# Patient Record
Sex: MALE | Race: WHITE | URBAN - NONMETROPOLITAN AREA
[De-identification: names, ages, dates, MRNs, and addresses within clinical notes are randomized per-mention and may not be internally consistent; named-entity substitution may affect disease eponyms.]

---

## 2018-01-23 ENCOUNTER — DOCUMENTATION ONLY (OUTPATIENT)
Dept: FAMILY MEDICINE | Facility: OTHER | Age: 18
End: 2018-01-23

## 2018-01-23 RX ORDER — ARIPIPRAZOLE 5 MG/1
5 TABLET ORAL EVERY MORNING
COMMUNITY
Start: 2015-07-06 | End: 2018-04-10

## 2018-04-10 ENCOUNTER — APPOINTMENT (OUTPATIENT)
Dept: GENERAL RADIOLOGY | Facility: CLINIC | Age: 18
End: 2018-04-10
Attending: EMERGENCY MEDICINE
Payer: COMMERCIAL

## 2018-04-10 ENCOUNTER — HOSPITAL ENCOUNTER (EMERGENCY)
Facility: CLINIC | Age: 18
Discharge: HOME | End: 2018-04-10
Attending: EMERGENCY MEDICINE | Admitting: EMERGENCY MEDICINE
Payer: COMMERCIAL

## 2018-04-10 ENCOUNTER — TELEPHONE (OUTPATIENT)
Dept: FAMILY MEDICINE | Facility: OTHER | Age: 18
End: 2018-04-10

## 2018-04-10 VITALS
TEMPERATURE: 98.6 F | RESPIRATION RATE: 16 BRPM | SYSTOLIC BLOOD PRESSURE: 130 MMHG | WEIGHT: 156 LBS | DIASTOLIC BLOOD PRESSURE: 88 MMHG | HEART RATE: 74 BPM | OXYGEN SATURATION: 100 % | BODY MASS INDEX: 27.42 KG/M2

## 2018-04-10 DIAGNOSIS — R07.89 ATYPICAL CHEST PAIN: ICD-10-CM

## 2018-04-10 PROCEDURE — 71046 X-RAY EXAM CHEST 2 VIEWS: CPT | Mod: TC

## 2018-04-10 PROCEDURE — 93010 ELECTROCARDIOGRAM REPORT: CPT | Mod: Z6 | Performed by: EMERGENCY MEDICINE

## 2018-04-10 PROCEDURE — 99284 EMERGENCY DEPT VISIT MOD MDM: CPT | Mod: 25 | Performed by: EMERGENCY MEDICINE

## 2018-04-10 PROCEDURE — 93005 ELECTROCARDIOGRAM TRACING: CPT | Performed by: EMERGENCY MEDICINE

## 2018-04-10 NOTE — ED PROVIDER NOTES
"  History     Chief Complaint   Patient presents with     Chest Pain     The history is provided by the patient and medical records.     This is an otherwise basically healthy 17-year-old male who identifies as female presenting with chest pain.  Patient has had some vague chest pains that come and go for \"10 years\".  The pain is described as a \"electric pain\".  Usually it will come on with no inciting event and last for up to 3 minutes.  Today it seemed to last longer than usual.  At the time, patient had just woke up and was playing a video game and drinking some coffee.  Apparently, the pains have been in various areas of the chest but today it was underneath the left breast area.  Patient denies any cough or cold symptoms, fevers, chills, shortness of breath or increased pain with deep breath.  There is currently no pain.  No calf pain or swelling.  Patient does not smoke.  No illicit drug use.  No increased exercise or strenuous activity.  No trauma.  No other symptoms including nausea, vomiting, bowel or bladder symptoms, back pain.    Patient states that his mom and younger brother have been diagnosed with \"pleuritic spasms\" with similar chest pain symptoms in the past.  Patient has not taken any medications for this pain.  No hormone medications.    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% 11/08/2012     Priority: Medium        Past Medical History:  "   Past Medical History:   Diagnosis Date     Asthma      Attention-deficit hyperactivity disorder, predominantly inattentive type      Cardiac murmur      Other ill-defined and unknown causes of morbidity and mortality        Past Surgical History:    Past Surgical History:   Procedure Laterality Date     OTHER SURGICAL HISTORY      [id number],NO PREVIOUS SURGERY       Family History:    Family History   Problem Relation Age of Onset     Family History Negative Father      Good Health     Family History Negative Mother      Good Health     Other - See Comments Maternal Uncle      Francesca's Symdrome     Thyroid Disease Paternal Grandmother      Thyroid Disease     Thyroid Disease Paternal Uncle      Thyroid Disease       Social History:  Marital Status:  Single [1]  Social History   Substance Use Topics     Smoking status: Never Smoker     Smokeless tobacco: Never Used      Comment: Quit smoking: Dad smokes outside & sometimes in the car     Alcohol use No        Medications:      No current outpatient prescriptions on file.      Review of Systems   All other ROS reviewed and are negative or non-contributory except as stated in HPI.     Physical Exam   BP: 130/88  Pulse: 74  Temp: 98.6  F (37  C)  Resp: 16  Weight: 70.8 kg (156 lb)  SpO2: 100 %      Physical Exam   Constitutional: He is oriented to person, place, and time. He appears well-developed and well-nourished.   Patient is sitting in the bed, appears healthy, interactive.  Side ponytails.   HENT:   Head: Normocephalic.   Nose: Nose normal.   Mouth/Throat: Oropharynx is clear and moist.   Bilateral canals occluded by cerumen   Eyes: Conjunctivae and EOM are normal. Pupils are equal, round, and reactive to light. No scleral icterus.   Neck: Normal range of motion. Neck supple.   Cardiovascular: Normal rate, regular rhythm, normal heart sounds and intact distal pulses.    No murmur heard.  Pulmonary/Chest: Effort normal and breath sounds normal. He exhibits no  tenderness.   Gynecomastia   Abdominal: Soft. Bowel sounds are normal. There is no tenderness.   Musculoskeletal: Normal range of motion. He exhibits no edema or tenderness.   Neurological: He is alert and oriented to person, place, and time. He exhibits normal muscle tone.   Skin: Skin is warm and dry. He is not diaphoretic.   Psychiatric: He has a normal mood and affect. His behavior is normal.   Vitals reviewed.      ED Course (with Medical Decision Making)  Pt seen and examined by me.  RN and EPIC notes reviewed.      Patient with atypical chest pain as described.  This is been going on for many years.  Currently chest pain-free.  No significant risk factors for PE.    EKG was done which is normal.  Chest x-ray also done that was normal.  Results discussed and patient was reassured.  Follow-up with primary care provider for recheck for continued symptoms.  Return for significant worsening, changes or concerns.       Procedures         EKG Interpretation:      Interpreted by Steff Jackman  Time reviewed: 1218  Symptoms at time of EKG: none   Rhythm: normal sinus   Rate: normal  Axis: normal  Ectopy: none  Conduction: normal  ST Segments/ T Waves: No ST-T wave changes  Q Waves: none  Comparison to prior: No old EKG available    Clinical Impression: normal EKG      Results for orders placed or performed during the hospital encounter of 04/10/18 (from the past 24 hour(s))   Chest XR,  PA & LAT    Narrative    CHEST TWO VIEWS  April 10, 2018 12:25 PM     HISTORY: 18-year-old with chest pain.    COMPARISON: None.       Impression    IMPRESSION: Heart size is normal. No pleural effusion, pneumothorax,  or abnormal area of consolidation.    RUPALI BLANK MD       Medications - No data to display    Assessments & Plan     I have reviewed the findings, diagnosis, plan and need for follow up with the patient.  There are no discharge medications for this patient.      Final diagnoses:   Atypical chest pain      Disposition: Patient discharged home in stable condition.  Plan as above.  Return for concerns.     Note: Chart documentation done in part with Dragon Voice Recognition software. Although reviewed after completion, some word and grammatical errors may remain.     4/10/2018   [facility] EMERGENCY DEPARTMENT     Steff Jackman MD  04/11/18 0137

## 2018-04-10 NOTE — DISCHARGE INSTRUCTIONS
The EKG and the chest x-ray were normal.    This may be some spasming of the muscles in your chest or this may be some form of pleuritic pain.    At this point, I think you are safe to return home.    If you have any worsening or changes follow-up in clinic or return as needed.    Good luck in college!!

## 2018-04-10 NOTE — TELEPHONE ENCOUNTER
"Everardo Quintana is a 17 year old male    NURSING ASSESSMENT:  Description:  Patient calls this morning with complaint of chest pain lasting for the past 5-6 hours. Stated that it started right after he woke up. Describes the pain as a constant \"stabbing with a shock like he's being tazzed\".  Pt states that he has had CP before \"my whole life\", but it has never lasted this long. Usually it goes away after 3-5 minutes. He is also reporting difficulty breathing. Pt is clearly speaking on the phone in full sentences. DENIES nausea, vomiting, dizziness, weakness, sweaty or cool skin, heart palpitations or pain in the jaw or shoulders.   Onset/duration:  5-6 hours ago  Precip. factors:  Unknown   Improves/worsens symptoms:  Pain is not subsiding with rest.   Pain scale (0-10)   10/10  Last exam/Treatment:  Not on File   Allergies: No Known Allergies  NURSING PLAN: Nursing advice to patient Call 911. Pt is not wanting to call an ambulance. His father is home with him and he will have him bring him to the ER. Advised to take an aspirin.     RECOMMENDED DISPOSITION:  Call 911 - see above   Will comply with recommendation: Yes  If further questions/concerns or if symptoms do not improve, worsen or new symptoms develop, call your PCP or [facility] as soon as possible.      Guideline used: Chest Pain   Telephone Triage Protocols for Nurses, Fifth Edition, Jeannie Fernandez RN      "

## 2018-04-10 NOTE — ED AVS SNAPSHOT
[facility] Emergency Department    [address] [place] [address]    Phone:  [phone]    Fax:  [phone]                                       Everardo Quintana   MRN: [id number]    Department:  [facility] Emergency Department   Date of Visit:  4/10/2018           Patient Information     Date Of Birth          2000        Your diagnoses for this visit were:     Atypical chest pain        You were seen by Steff Jackman MD.      Follow-up Information     Follow up with primary provider.    Why:  As needed        Discharge Instructions       The EKG and the chest x-ray were normal.    This may be some spasming of the muscles in your chest or this may be some form of pleuritic pain.    At this point, I think you are safe to return home.    If you have any worsening or changes follow-up in clinic or return as needed.    Good luck in college!!    Discharge References/Attachments     CHEST PAIN, UNCERTAIN CAUSE (ENGLISH)      24 Hour Appointment Hotline       To make an appointment at any [facility] clinic, call [phone] ([phone]). If you don't have a family doctor or clinic, we will help you find one. [facility] clinics are conveniently located to serve the needs of you and your family.             Review of your medicines      Notice     You have not been prescribed any medications.            Procedures and tests performed during your visit     Chest XR,  PA & LAT    EKG 12-lead, tracing only      Orders Needing Specimen Collection     None      Pending Results     Date and Time Order Name Status Description    4/10/2018 1201 Chest XR,  PA & LAT Preliminary             Pending Culture Results     No orders found from 4/8/2018 to 4/11/2018.            Pending Results Instructions     If you had any lab results that were not finalized at the time of your Discharge, you can call the ED Lab Result RN at [phone]. You will be contacted by this team for any  "positive Lab results or changes in treatment. The nurses are available 7 days a week from 10A to 6:30P.  You can leave a message 24 hours per day and they will return your call.        Thank you for choosing [facility]       Thank you for choosing [facility] for your care. Our goal is always to provide you with excellent care. Hearing back from our patients is one way we can continue to improve our services. Please take a few minutes to complete the written survey that you may receive in the mail after you visit with us. Thank you!        [employer] Information     [employer] lets you send messages to your doctor, view your test results, renew your prescriptions, schedule appointments and more. To sign up, go to [email or web address] . Click on \"Log in\" on the left side of the screen, which will take you to the Welcome page. Then click on \"Sign up Now\" on the right side of the page.     You will be asked to enter the access code listed below, as well as some personal information. Please follow the directions to create your username and password.     Your access code is: [email or web address]  Expires: 2018 12:40 PM     Your access code will  in 90 days. If you need help or a new code, please call your [facility] clinic or [phone].        Care EveryWhere ID     This is your Care EveryWhere ID. This could be used by other organizations to access your [facility] medical records  [id number]        Equal Access to Services     CARMINE BRIDGES : Hadii amira Marinelli, waaxda luemeli, qaybta kaalmada fernando, marge cervantes . So [facility] [phone].    ATENCIÓN: Si habla español, tiene a arzola disposición servicios gratuitos de asistencia lingüística. Ismael ornelas [phone].    We comply with applicable federal civil rights laws and [place] laws. We do not discriminate on the basis of race, color, national origin, age, disability, sex, sexual orientation, or gender identity.            After Visit " Summary       This is your record. Keep this with you and show to your community pharmacist(s) and doctor(s) at your next visit.

## 2018-04-10 NOTE — ED AVS SNAPSHOT
[facility] Emergency Department    [address] [place] [address]    Phone:  [phone]    Fax:  [phone]                                       Everardo Quintana   MRN: [id number]    Department:  [facility] Emergency Department   Date of Visit:  4/10/2018           After Visit Summary Signature Page     I have received my discharge instructions, and my questions have been answered. I have discussed any challenges I see with this plan with the nurse or doctor.    ..........................................................................................................................................  Patient/Patient Representative Signature      ..........................................................................................................................................  Patient Representative Print Name and Relationship to Patient    ..................................................               ................................................  Date                                            Time    ..........................................................................................................................................  Reviewed by Signature/Title    ...................................................              ..............................................  Date                                                            Time